# Patient Record
Sex: MALE | Race: WHITE | NOT HISPANIC OR LATINO | Employment: UNEMPLOYED | URBAN - METROPOLITAN AREA
[De-identification: names, ages, dates, MRNs, and addresses within clinical notes are randomized per-mention and may not be internally consistent; named-entity substitution may affect disease eponyms.]

---

## 2023-03-08 ENCOUNTER — OFFICE VISIT (OUTPATIENT)
Dept: URGENT CARE | Facility: CLINIC | Age: 23
End: 2023-03-08
Payer: COMMERCIAL

## 2023-03-08 VITALS
SYSTOLIC BLOOD PRESSURE: 120 MMHG | WEIGHT: 145 LBS | OXYGEN SATURATION: 98 % | TEMPERATURE: 98 F | HEART RATE: 70 BPM | RESPIRATION RATE: 18 BRPM | DIASTOLIC BLOOD PRESSURE: 82 MMHG | BODY MASS INDEX: 23.3 KG/M2 | HEIGHT: 66 IN

## 2023-03-08 DIAGNOSIS — J02.0 STREP THROAT: ICD-10-CM

## 2023-03-08 DIAGNOSIS — J02.9 SORE THROAT: Primary | ICD-10-CM

## 2023-03-08 LAB
CTP QC/QA: YES
MOLECULAR STREP A: NEGATIVE

## 2023-03-08 PROCEDURE — 87651 STREP A DNA AMP PROBE: CPT | Mod: QW,S$GLB,,

## 2023-03-08 PROCEDURE — 99204 PR OFFICE/OUTPT VISIT, NEW, LEVL IV, 45-59 MIN: ICD-10-PCS | Mod: S$GLB,,,

## 2023-03-08 PROCEDURE — 99204 OFFICE O/P NEW MOD 45 MIN: CPT | Mod: S$GLB,,,

## 2023-03-08 PROCEDURE — 87651 POCT STREP A MOLECULAR: ICD-10-PCS | Mod: QW,S$GLB,,

## 2023-03-08 RX ORDER — AMOXICILLIN AND CLAVULANATE POTASSIUM 875; 125 MG/1; MG/1
1 TABLET, FILM COATED ORAL EVERY 12 HOURS
Qty: 20 TABLET | Refills: 0 | Status: SHIPPED | OUTPATIENT
Start: 2023-03-08 | End: 2023-03-18

## 2023-03-08 NOTE — LETTER
March 8, 2023      [place] [facility]  [address] [place] [address] [place], [address]  [facility] [address]  Phone: [phone]  Fax: [phone]       Patient: Dustin Tilley   YOB: 2000  Date of Visit: 03/08/2023    To Whom It May Concern:    Wicho Tilley  was at [facility] on 03/08/2023. The patient may return to work/school on 03/09/2023 with no restrictions. If you have any questions or concerns, or if I can be of further assistance, please do not hesitate to contact me. Please excuse for 03/07/2023-03/09/2023    Sincerely,    Fariha Oconnor, NP

## 2023-03-08 NOTE — PROGRESS NOTES
"Subjective:       Patient ID: Dustin Tilley III is a 22 y.o. male.    Vitals:  height is 5' 6" (1.676 m) and weight is 65.8 kg (145 lb). His oral temperature is 98.3 °F (36.8 °C). His blood pressure is 120/82 and his pulse is 70. His respiration is 18 and oxygen saturation is 98%.     Chief Complaint: Sore Throat    This is a 22 y.o. male who presents today with a chief complaint of Sore Throat.  Patient presents with Sore Throat. States sore throat since Friday. Pt states that he is also having congestion, headache, cough and one episode of vomiting on Sunday. Pt states that he his headaches have since improved. Pt denies any nausea or other symptoms at this time    Sore Throat   This is a new problem. The current episode started in the past 7 days. The problem has been waxing and waning. The pain is worse on the right side. There has been no fever. The pain is at a severity of 6/10. Associated symptoms include congestion and coughing. He has tried nothing for the symptoms. The treatment provided no relief.     Constitution: Positive for fatigue.   HENT:  Positive for congestion and sore throat.    Neck: neck negative.   Cardiovascular: Negative.    Eyes: Negative.    Respiratory:  Positive for cough.    Gastrointestinal: Negative.         One episode of vomiting on sunday   Endocrine: negative.   Genitourinary: Negative.    Musculoskeletal: Negative.    Skin: Negative.    Allergic/Immunologic: Negative.    Neurological: Negative.         Pt states that he did have headaches but has not had one since yesterday   Hematologic/Lymphatic: Negative.    Psychiatric/Behavioral: Negative.           Objective:      Physical Exam   Constitutional: He is oriented to person, place, and time. normal  HENT:   Head: Normocephalic and atraumatic.   Ears:   Right Ear: Tympanic membrane, external ear and ear canal normal.   Left Ear: Tympanic membrane, external ear and ear canal normal.   Nose: Congestion present.   Mouth/Throat: " Uvula is midline. Mucous membranes are moist. Oropharyngeal exudate, posterior oropharyngeal erythema and cobblestoning present.   Eyes: Conjunctivae are normal. Pupils are equal, round, and reactive to light. Extraocular movement intact   Neck: Neck supple.   Cardiovascular: Normal rate, regular rhythm, normal heart sounds and normal pulses.   Pulmonary/Chest: Effort normal and breath sounds normal.   Abdominal: Normal appearance and bowel sounds are normal. Soft. flat abdomen   Musculoskeletal: Normal range of motion.         General: Normal range of motion.   Neurological: no focal deficit. He is alert, oriented to person, place, and time and at baseline.   Skin: Skin is warm and dry. Capillary refill takes less than 2 seconds.   Psychiatric: His behavior is normal. Mood and thought content normal.   Nursing note and vitals reviewed.      Past medical history and current medications reviewed.       Assessment:     Results for orders placed or performed in visit on 03/08/23   POCT Strep A, Molecular   Result Value Ref Range    Molecular Strep A, POC Negative Negative     Acceptable Yes              1. Sore throat    2. Strep throat              Plan:         Sore throat  -     POCT Strep A, Molecular    Strep throat  -     Ambulatory referral/consult to ENT  -     amoxicillin-clavulanate 875-125mg (AUGMENTIN) 875-125 mg per tablet; Take 1 tablet by mouth every 12 (twelve) hours. for 10 days  Dispense: 20 tablet; Refill: 0             There are no Patient Instructions on file for this visit.